# Patient Record
Sex: MALE | Race: WHITE | NOT HISPANIC OR LATINO | Employment: OTHER | URBAN - METROPOLITAN AREA
[De-identification: names, ages, dates, MRNs, and addresses within clinical notes are randomized per-mention and may not be internally consistent; named-entity substitution may affect disease eponyms.]

---

## 2022-01-14 ENCOUNTER — HOSPITAL ENCOUNTER (EMERGENCY)
Facility: HOSPITAL | Age: 27
Discharge: HOME | End: 2022-01-14
Attending: EMERGENCY MEDICINE

## 2022-01-14 VITALS
WEIGHT: 150 LBS | HEIGHT: 70 IN | HEART RATE: 104 BPM | BODY MASS INDEX: 21.47 KG/M2 | DIASTOLIC BLOOD PRESSURE: 60 MMHG | OXYGEN SATURATION: 98 % | TEMPERATURE: 99 F | RESPIRATION RATE: 20 BRPM | SYSTOLIC BLOOD PRESSURE: 104 MMHG

## 2022-01-14 DIAGNOSIS — S51.812A LACERATION OF LEFT FOREARM, INITIAL ENCOUNTER: Primary | ICD-10-CM

## 2022-01-14 DIAGNOSIS — T14.90XA TRAUMA: ICD-10-CM

## 2022-01-14 PROCEDURE — 90715 TDAP VACCINE 7 YRS/> IM: CPT | Performed by: EMERGENCY MEDICINE

## 2022-01-14 PROCEDURE — 63600175 PHARM REV CODE 636 W HCPCS: Performed by: EMERGENCY MEDICINE

## 2022-01-14 PROCEDURE — 99284 EMERGENCY DEPT VISIT MOD MDM: CPT | Mod: 25

## 2022-01-14 PROCEDURE — 12032 INTMD RPR S/A/T/EXT 2.6-7.5: CPT

## 2022-01-14 PROCEDURE — 90471 IMMUNIZATION ADMIN: CPT | Performed by: EMERGENCY MEDICINE

## 2022-01-14 RX ORDER — CEPHALEXIN 500 MG/1
500 CAPSULE ORAL EVERY 8 HOURS
Qty: 21 CAPSULE | Refills: 0 | Status: SHIPPED | OUTPATIENT
Start: 2022-01-14 | End: 2022-01-21

## 2022-01-14 RX ORDER — CEFTRIAXONE 1 G/1
1 INJECTION, POWDER, FOR SOLUTION INTRAMUSCULAR; INTRAVENOUS
Status: COMPLETED | OUTPATIENT
Start: 2022-01-14 | End: 2022-01-14

## 2022-01-14 RX ORDER — LIDOCAINE HYDROCHLORIDE AND EPINEPHRINE 10; 10 MG/ML; UG/ML
1 INJECTION, SOLUTION INFILTRATION; PERINEURAL ONCE
Status: DISCONTINUED | OUTPATIENT
Start: 2022-01-14 | End: 2022-01-14 | Stop reason: HOSPADM

## 2022-01-14 RX ADMIN — CEFTRIAXONE 1 G: 1 INJECTION, POWDER, FOR SOLUTION INTRAMUSCULAR; INTRAVENOUS at 04:01

## 2022-01-14 RX ADMIN — CLOSTRIDIUM TETANI TOXOID ANTIGEN (FORMALDEHYDE INACTIVATED), CORYNEBACTERIUM DIPHTHERIAE TOXOID ANTIGEN (FORMALDEHYDE INACTIVATED), BORDETELLA PERTUSSIS TOXOID ANTIGEN (GLUTARALDEHYDE INACTIVATED), BORDETELLA PERTUSSIS FILAMENTOUS HEMAGGLUTININ ANTIGEN (FORMALDEHYDE INACTIVATED), BORDETELLA PERTUSSIS PERTACTIN ANTIGEN, AND BORDETELLA PERTUSSIS FIMBRIAE 2/3 ANTIGEN 0.5 ML: 5; 2; 2.5; 5; 3; 5 INJECTION, SUSPENSION INTRAMUSCULAR at 04:01

## 2022-01-14 NOTE — DISCHARGE INSTRUCTIONS
RETURN TO EMERGENCY DEPARTMENT WITHOUT FAIL, IF YOUR SYMPTOMS WORSEN, IF YOU GET NEW OR DIFFERENT SYMPTOMS, IF YOU ARE UNABLE TO FOLLOW UP AS DIRECTED, OR IF YOU HAVE ANY CONCERNS OR WORRIES.    Sutures will be removed in 10 days.  Take antibiotics with as directed.

## 2022-01-14 NOTE — ED PROVIDER NOTES
Encounter Date: 1/14/2022       History     Chief Complaint   Patient presents with    Laceration     L AC METAL ROOF     This is a 26-year-old male who presents emergency department with laceration to his left AC region.  He was finishing up up metal roof when he pushed his arm into the edge of the roof and it lacerated his left AC region.  He says he sustained a large amount of bleeding at the site.  He put some compression on his arm came to the emergency department.  His tetanus vaccine is not up-to-date.  He does not have any other complaints of pain.        Review of patient's allergies indicates:   Allergen Reactions    Sulfa (sulfonamide antibiotics)      History reviewed. No pertinent past medical history.  No past surgical history on file.  No family history on file.     Review of Systems   Constitutional: Negative for fever.   HENT: Negative for sore throat.    Respiratory: Negative for shortness of breath.    Cardiovascular: Negative for chest pain.   Gastrointestinal: Negative for nausea.   Genitourinary: Negative for dysuria.   Musculoskeletal: Negative for back pain.   Skin: Positive for wound. Negative for rash.   Neurological: Negative for weakness.   Hematological: Does not bruise/bleed easily.       Physical Exam     Initial Vitals [01/14/22 1429]   BP Pulse Resp Temp SpO2   104/60 104 20 98.7 °F (37.1 °C) 98 %      MAP       --         Physical Exam    Nursing note and vitals reviewed.  Constitutional: He appears well-developed and well-nourished. He is not diaphoretic. No distress.   HENT:   Head: Normocephalic and atraumatic.   Mouth/Throat: Oropharynx is clear and moist. No oropharyngeal exudate.   Eyes: Conjunctivae and EOM are normal. Pupils are equal, round, and reactive to light.   Neck: Neck supple. No tracheal deviation present.   Normal range of motion.  Cardiovascular: Normal rate, regular rhythm, normal heart sounds and intact distal pulses.   No murmur heard.  Pulmonary/Chest:  Breath sounds normal. No respiratory distress. He has no wheezes. He has no rhonchi. He has no rales.   Abdominal: Abdomen is soft. Bowel sounds are normal. He exhibits no distension. There is no abdominal tenderness.   Musculoskeletal:         General: No tenderness or edema. Normal range of motion.      Cervical back: Normal range of motion and neck supple.      Comments: Left forearm:  AC region:  There is a the laceration present medial aspect of the forearm just distal to the antecubital fossa region.  There is moderate amount of what appears to be dark oozing present when bandage is removed.  Laceration is 4 cm and is C-shaped.  No pulsatile bleeding noted.  There is a palpable pulse in the radial artery distally.  Normal sensation light touch.  Full range of motion distally in flexion of all fingers.   no obvious functional tendon deficit.     Neurological: He is alert and oriented to person, place, and time. He has normal strength. No cranial nerve deficit or sensory deficit.   Skin: Skin is warm and dry. Capillary refill takes less than 2 seconds. No rash noted. No erythema. No pallor.   Psychiatric: He has a normal mood and affect.         ED Course   Lac Repair    Date/Time: 1/14/2022 5:31 PM  Performed by: Tacos Oconnor DO  Authorized by: Tacos Oconnor DO     Consent:     Consent obtained:  Verbal    Consent given by:  Patient    Risks, benefits, and alternatives were discussed: yes      Risks discussed:  Infection, pain, poor cosmetic result, poor wound healing, tendon damage and vascular damage    Alternatives discussed:  No treatment  Universal protocol:     Procedure explained and questions answered to patient or proxy's satisfaction: no      Relevant documents present and verified: no      Test results available: no      Imaging studies available: yes      Patient identity confirmed:  Verbally with patient  Anesthesia:     Anesthesia method:  Local infiltration    Local anesthetic:  Lidocaine 1%  WITH epi  Laceration details:     Location:  Shoulder/arm    Shoulder/arm location:  L lower arm    Length (cm):  4  Pre-procedure details:     Preparation:  Patient was prepped and draped in usual sterile fashion and imaging obtained to evaluate for foreign bodies  Exploration:     Hemostasis achieved with:  Tied off vessels, tourniquet, direct pressure and epinephrine    Imaging obtained: x-ray      Imaging outcome: foreign body not noted      Wound exploration: wound explored through full range of motion and entire depth of wound visualized      Wound extent: areolar tissue violated, nerve damage (Peripheral sensory nerve) and vascular damage (venous injury)      Wound extent: no fascia violation noted, no foreign bodies/material noted, no muscle damage noted, no tendon damage noted and no underlying fracture noted      Contaminated: no    Treatment:     Area cleansed with:  Povidone-iodine    Amount of cleaning:  Extensive    Irrigation solution:  Sterile water    Irrigation volume:  500    Irrigation method:  Syringe    Debridement:  None    Undermining:  None    Scar revision: no      Layers/structures repaired:  Deep subcutaneous  Deep subcutaneous:     Suture size:  4-0    Suture material:  Vicryl    Suture technique:  Figure eight    Number of sutures:  2  Skin repair:     Repair method:  Sutures    Suture size:  4-0    Suture technique:  Simple interrupted    Number of sutures:  11  Approximation:     Approximation:  Close  Repair type:     Repair type:  Complex  Post-procedure details:     Dressing:  Non-adherent dressing    Procedure completion:  Tolerated well, no immediate complications  Comments:      Patient re-evaluated through full range of motion 2 hours after repair and patient had no further bleeding or bruising.  Bleeding stopped after figure-of-eight suture x2 applied.  No for your further bleeding noted after blood pressure cuff/tourniquet removed.  Total tourniquet/blood pressure cuff time  was 30 minutes.      Labs Reviewed - No data to display       Imaging Results          X-Ray Elbow Complete Left (Final result)  Result time 01/14/22 16:34:47    Final result by Jonathan Mcclelland MD (01/14/22 16:34:47)                 Narrative:    XR ELBOW 3 VIEWS    CLINICAL HISTORY:  26 years Male Laceration to medial left elbow, best lateral possible, pt unable to tolerate bending arm    COMPARISON: None    FINDINGS: Soft tissue irregularity along the medial aspect of the elbow consistent with laceration. No radiopaque foreign body. No acute fracture. Joint spaces are maintained. No joint effusion.    IMPRESSION:    No acute osseous abnormality.    Electronically signed by:  Jonathan Mcclelland MD  1/14/2022 4:34 PM [facility] Workstation: [id number]                               Medications   cefTRIAXone injection 1 g (1 g Intramuscular Given 1/14/22 1632)   Tdap vaccine injection 0.5 mL (0.5 mLs Intramuscular Given 1/14/22 1629)     Medical Decision Making:   ED Management:  This is a 26-year-old male who presents emergency department with a laceration to the arm in the antecubital fossa region.  Patient had deep laceration with a significant amount of venous oozing with bandage removed.  Blood pressure cuff was applied to the proximal arm to facilitate with closure.  Two figure-of-eight sutures were placed in the areas of venous oozing which achieved good hemostasis.  Lidocaine with epinephrine was used.  Multiple skin sutures were placed as seen in the procedure note.  Patient was given ceftriaxone shot and also given tetanus vaccine in the emergency department.  X-ray was obtained to rule out any foreign body, none was found.  No obvious fracture, low suspicion for this.  Considered tendon injury/nerve injury.  Patient did have some peripheral neuropathy on the medial aspect of his forearm.  May have caught a peripheral nerve.  I do not feel he lacerated any tendon.  I did not see any obvious tendon laceration did not  have any functional tendon deficit.  I counseled him on infection precautions and recommended Keflex to go home with.  I counseled him in detail regarding infection precautions, tendon injury, possible foreign body, signs for arterial injury etcetera and answered all his questions.  I do not feel that he had any arterial injury as he has good flow distally palpable radial pulse neurovascular intact distally, no expanding hematoma, no obvious hard signs of vascular injury after repair.  He will follow up with PCP on an outpatient basis.    I had a detailed discussion with the patient and/or guardian regarding: The historical points, exam findings, and diagnostic results supporting the discharge diagnosis, lab results, pertinent radiology results, and the need for outpatient follow-up, for definitive care with a family practitioner and to return to the emergency department if symptoms worsen or persist or if there are any questions or concerns that arise at home. All questions have been answered in detail. Strict return to Emergency Department precautions have been provided    A dictation software program was used for this note.  Please expect some simple typographical  errors in this note.    This patient was seen during the context of the Covid 19 global pandemic where local, state, hospital guidelines, were followed to the best of ability given the circumstances of the pandemic.                        Clinical Impression:   Final diagnoses:  [T14.90XA] Trauma  [S51.812A] Laceration of left forearm, initial encounter (Primary)          ED Disposition Condition    Discharge Stable        ED Prescriptions     Medication Sig Dispense Start Date End Date Auth. Provider    cephALEXin (KEFLEX) 500 MG capsule Take 1 capsule (500 mg total) by mouth every 8 (eight) hours. for 7 days 21 capsule 1/14/2022 1/21/2022 Tacos Oconnor, DO        Follow-up Information     Follow up With Specialties Details Why Contact Info  Additional Information    [facility] - Emergency Dept Emergency Medicine  If symptoms worsen [address]  [facility] [address]  [phone] 1st floor    [facility]  In 10 days  [address]  [facility] [address]  [phone]              Tacos Oconnor,   01/14/22 9466